# Patient Record
Sex: MALE
[De-identification: names, ages, dates, MRNs, and addresses within clinical notes are randomized per-mention and may not be internally consistent; named-entity substitution may affect disease eponyms.]

---

## 2019-03-05 PROBLEM — Z00.00 ENCOUNTER FOR PREVENTIVE HEALTH EXAMINATION: Status: ACTIVE | Noted: 2019-03-05

## 2019-04-12 ENCOUNTER — LABORATORY RESULT (OUTPATIENT)
Age: 54
End: 2019-04-12

## 2019-04-12 ENCOUNTER — APPOINTMENT (OUTPATIENT)
Dept: GASTROENTEROLOGY | Facility: CLINIC | Age: 54
End: 2019-04-12
Payer: COMMERCIAL

## 2019-04-12 VITALS
HEART RATE: 69 BPM | HEIGHT: 68 IN | SYSTOLIC BLOOD PRESSURE: 140 MMHG | WEIGHT: 180 LBS | DIASTOLIC BLOOD PRESSURE: 90 MMHG | BODY MASS INDEX: 27.28 KG/M2

## 2019-04-12 DIAGNOSIS — Z12.11 ENCOUNTER FOR SCREENING FOR MALIGNANT NEOPLASM OF COLON: ICD-10-CM

## 2019-04-12 DIAGNOSIS — K64.8 OTHER HEMORRHOIDS: ICD-10-CM

## 2019-04-12 DIAGNOSIS — R19.7 DIARRHEA, UNSPECIFIED: ICD-10-CM

## 2019-04-12 DIAGNOSIS — R14.0 ABDOMINAL DISTENSION (GASEOUS): ICD-10-CM

## 2019-04-12 DIAGNOSIS — K21.0 GASTRO-ESOPHAGEAL REFLUX DISEASE WITH ESOPHAGITIS: ICD-10-CM

## 2019-04-12 DIAGNOSIS — Z86.2 PERSONAL HISTORY OF DISEASES OF THE BLOOD AND BLOOD-FORMING ORGANS AND CERTAIN DISORDERS INVOLVING THE IMMUNE MECHANISM: ICD-10-CM

## 2019-04-12 PROCEDURE — 99204 OFFICE O/P NEW MOD 45 MIN: CPT

## 2019-04-12 NOTE — HISTORY OF PRESENT ILLNESS
[de-identified] : \par PCP: Kailyn Parsons [facility]\par \par 52 yo M w PMH for Sarcoidosis\par Evaluated for Lymphadenoapthy, had expl lap w biopsy, ? + HSM and chest LNs\par No Pulm symptoms, so no prednisone was used\par States he has had GI issues for 6-7 yrs\par Main Complaint is Bloating, diarrhea, Flatus\par BMs: # 5-6, soft/pudding like, occas oil droplets\par No blood/mucous\par About 6 yrs ago had EGD--> erosive esophagits, Colonoscopy--> hemorrhoids\par Also a Breath test--doesn’t know for what, told  for some bacterial problem\par Never F/U was also to have a capsule endo\par No anorexia, wt loss, early satiety\par \par \par Today: no cp, sob cough \par \par * Abd pain—no\par * Nausea—no\par * Vomit—no\par * Belching—no\par * Regurgitation—no\par * Ht burn—occas \par * Throat Clearing—no\par * Globus—no\par * Cough—no\par * Hoarseness—no\par * Dysphagia—no\par * BMs: # 5-6, soft/pudding like, occas oil droplets \par * Constipation—no\par * Diarrhea—yes\par * Bloating—yes\par * Flatulence—yes\par * Gurgling—yes\par * Melena—no\par * BPBPR—no\par * Anorexia—no\par * Wt. Loss-no\par \par

## 2019-04-12 NOTE — ASSESSMENT
[FreeTextEntry1] : \par \par Diarrhea: > 6mo\par assoc Abdominal Bloat, Flatulence\par H/O Sarcdoidosis\par \par R/O IBD/Micscopic Colitis\par        Celiac\par        Sarcoidosis of GIT\par        Pancreatic Insufficiency\par        Functional\par \par P: Diet--LR, Lactose/Fat Free\par Low Brassica/FODMAPS\par check labs: cbc, esr, crp, cmet, tsh, ACE,iron, b12, fa, Celiac/IBD Panel\par Stool: calprotectin, elastase,o/p, c.diff, fecal fat\par EGD\par Colonoscopy\par CT Abd/Pelvis\par ? sbft\par to have old records faxed to us\par \par \par \par \par \par 1. Hemorrhoids:  well – controlled.  No pain,  swelling,  itch,  bleeding\par * Discussed the potential complications of thrombosis,  pain,  infection,  swelling, itching,  bleeding \par * Currently Low – Fiber Diet was reviewed and emphasized\par * 6  --  8 cups of decaffeinated fluid daily\par * Sitz Bathes BID,  No:  Anusol HC  Suppos / Cream  IA BID\par * No:  Tucks BID,   No:  Balneol Lotion,   No:  Calmoseptine Oint,   ++ Prep H prn\par * No:  Colorectal surgical evaluation for possible ablation \par \par \par \par \par \par \par 2. Colorectal Cancer Screening:  to be evaluated\par * Discussed the pre-malignant potential of polyps\par * Discussed the importance of f/u surveillance / screening\par * Currently Low-Fiber Diet was reviewed and emphasized\par * Anti-oxidants and ASA/NSAID Therapy reviewed\par * Given age > 45-49 yo\par * Recommend Colonoscopy\par * R/O  Colonic Neoplasia\par \par \par \par \par \par Informed Consent:\par * The risks & Benefits of EGD  /  Colonoscopy were discussed w patient.\par * This included but was not limited to perforation, bleeding, sedation /med rxns possibly requiring surgery, blood transfusions, antibiotics & CPR/Intubation.\par * Pt. understands & agrees to the procedures.\par * Pt. advised to D/C  ASA/NSAIDs  7  Days \par * [ +++ ]  Dulcolax / Miralax / Mag. Citrate ,  [     ] Prepopik/ Clenpiq ,  [     ] Osmo Prep,  [    ] GoLytely,  prep. reviewed w Pt.\par * Hold  [           ] AM of procedure.\par * Hold  [           ] PM of procedure.\par * Take  [           ] PM of procedure.\par * Take  [           ] AM of procedure.\par \par \par \par

## 2019-04-13 LAB
ALBUMIN SERPL ELPH-MCNC: 5.1 G/DL
ALP BLD-CCNC: 109 U/L
ALT SERPL-CCNC: 29 U/L
ANION GAP SERPL CALC-SCNC: 12 MMOL/L
AST SERPL-CCNC: 27 U/L
BASOPHILS # BLD AUTO: 0.02 K/UL
BASOPHILS NFR BLD AUTO: 0.4 %
BILIRUB SERPL-MCNC: 0.4 MG/DL
BUN SERPL-MCNC: 12 MG/DL
CALCIUM SERPL-MCNC: 9.9 MG/DL
CHLORIDE SERPL-SCNC: 102 MMOL/L
CO2 SERPL-SCNC: 28 MMOL/L
CREAT SERPL-MCNC: 0.9 MG/DL
CRP SERPL-MCNC: 0.12 MG/DL
EOSINOPHIL # BLD AUTO: 0.13 K/UL
EOSINOPHIL NFR BLD AUTO: 2.3 %
FERRITIN SERPL-MCNC: 120 NG/ML
FOLATE SERPL-MCNC: 12.4 NG/ML
GLUCOSE SERPL-MCNC: 106 MG/DL
HCT VFR BLD CALC: 44.6 %
HGB BLD-MCNC: 14.7 G/DL
IMM GRANULOCYTES NFR BLD AUTO: 0.5 %
IRON SATN MFR SERPL: 22 %
IRON SERPL-MCNC: 76 UG/DL
LYMPHOCYTES # BLD AUTO: 1.27 K/UL
LYMPHOCYTES NFR BLD AUTO: 22.4 %
MAN DIFF?: NORMAL
MCHC RBC-ENTMCNC: 29 PG
MCHC RBC-ENTMCNC: 33 GM/DL
MCV RBC AUTO: 88 FL
MONOCYTES # BLD AUTO: 0.79 K/UL
MONOCYTES NFR BLD AUTO: 13.9 %
NEUTROPHILS # BLD AUTO: 3.44 K/UL
NEUTROPHILS NFR BLD AUTO: 60.5 %
PLATELET # BLD AUTO: 193 K/UL
POTASSIUM SERPL-SCNC: 4.6 MMOL/L
PROT SERPL-MCNC: 8.1 G/DL
RBC # BLD: 5.07 M/UL
RBC # FLD: 13.7 %
SODIUM SERPL-SCNC: 142 MMOL/L
TIBC SERPL-MCNC: 348 UG/DL
TSH SERPL-ACNC: 1.7 UIU/ML
UIBC SERPL-MCNC: 272 UG/DL
VIT B12 SERPL-MCNC: >2000 PG/ML
WBC # FLD AUTO: 5.68 K/UL

## 2019-04-16 LAB
ACE BLD-CCNC: 40 U/L
GLIADIN IGA SER QL: 15.6 UNITS
GLIADIN IGG SER QL: 6.2 UNITS
GLIADIN PEPTIDE IGA SER-ACNC: NEGATIVE
GLIADIN PEPTIDE IGG SER-ACNC: NEGATIVE
MPO AB + PR3 PNL SER: NORMAL

## 2019-04-17 LAB
BAKER'S YEAST AB QL: 70.8 UNITS
BAKER'S YEAST IGA QL IA: 28.5 UNITS
BAKER'S YEAST IGA QN IA: ABNORMAL
BAKER'S YEAST IGG QN IA: POSITIVE
TTG IGA SER IA-ACNC: 1.2 U/ML
TTG IGA SER-ACNC: NEGATIVE
TTG IGG SER IA-ACNC: 1.7 U/ML
TTG IGG SER IA-ACNC: NEGATIVE

## 2019-05-13 LAB
C DIFF TOX GENS STL QL NAA+PROBE: NORMAL
CDIFF BY PCR: NOT DETECTED

## 2019-05-14 LAB — BACTERIA STL CULT: NORMAL

## 2019-05-15 LAB — DEPRECATED O AND P PREP STL: NORMAL

## 2019-05-19 LAB
CALPROTECTIN FECAL: <16 UG/G
FAT STL QN: NORMAL
FAT STL QN: NORMAL
PANCREATIC ELASTASE, FECAL: >500

## 2019-06-24 ENCOUNTER — APPOINTMENT (OUTPATIENT)
Dept: GASTROENTEROLOGY | Facility: HOSPITAL | Age: 54
End: 2019-06-24

## 2019-08-12 ENCOUNTER — APPOINTMENT (OUTPATIENT)
Dept: ENDOCRINOLOGY | Facility: CLINIC | Age: 54
End: 2019-08-12
Payer: COMMERCIAL

## 2019-08-12 VITALS
BODY MASS INDEX: 29.16 KG/M2 | HEART RATE: 70 BPM | SYSTOLIC BLOOD PRESSURE: 152 MMHG | DIASTOLIC BLOOD PRESSURE: 90 MMHG | OXYGEN SATURATION: 96 % | WEIGHT: 188 LBS | HEIGHT: 67.25 IN

## 2019-08-12 DIAGNOSIS — R63.5 ABNORMAL WEIGHT GAIN: ICD-10-CM

## 2019-08-12 DIAGNOSIS — E34.9 ENDOCRINE DISORDER, UNSPECIFIED: ICD-10-CM

## 2019-08-12 PROCEDURE — 36415 COLL VENOUS BLD VENIPUNCTURE: CPT

## 2019-08-12 PROCEDURE — 99205 OFFICE O/P NEW HI 60 MIN: CPT | Mod: 25

## 2019-08-12 NOTE — REASON FOR VISIT
[Initial Eval - Existing Diagnosis] : an initial evaluation of an existing diagnosis [FreeTextEntry1] : To check his hormones

## 2019-08-12 NOTE — HISTORY OF PRESENT ILLNESS
[FreeTextEntry1] : Mr. ELDON REES presents for  Consultation for weight gain and to get his metabolism and hormones  checked \par Has been gaining weight over last 2   yrs , it has been steady \par has h/o joint pain  , h/o arthritis ,worried about abdominal obesity \par Diet is mostly white meat, avoids red meat. tries to eat healthy \par Exercise is not very much. plantar fascitis, h/o gout. walks , elliptical machine \par admits of stress in life \par medications tried -none \par family h/o obesity -none, \par steroid use -prednisone use, for sarcoidosis , took last 2 yrs back \par had swollen lymph nodes \par psyche medications - none  \par symptoms to suggest Cushing's syndrome none \par symptoms to suggests acromegaly none \par Sleep patterns - sleep is not restorative \par Family h/o diabetes -none \par Thyroid issues - none \par other endocrinologic disorders - none \par questionable h/o low testosterone in the past, took 6-8 weeks, not felt any different -8 yrs back. \par no children of his own. \par Also has concomitant diagnosis of sarcoidosis

## 2019-08-14 ENCOUNTER — LABORATORY RESULT (OUTPATIENT)
Age: 54
End: 2019-08-14

## 2019-08-15 ENCOUNTER — LABORATORY RESULT (OUTPATIENT)
Age: 54
End: 2019-08-15

## 2019-08-21 ENCOUNTER — LABORATORY RESULT (OUTPATIENT)
Age: 54
End: 2019-08-21

## 2019-09-05 ENCOUNTER — APPOINTMENT (OUTPATIENT)
Dept: ENDOCRINOLOGY | Facility: CLINIC | Age: 54
End: 2019-09-05